# Patient Record
Sex: MALE | Race: BLACK OR AFRICAN AMERICAN | NOT HISPANIC OR LATINO | Employment: UNEMPLOYED | URBAN - METROPOLITAN AREA
[De-identification: names, ages, dates, MRNs, and addresses within clinical notes are randomized per-mention and may not be internally consistent; named-entity substitution may affect disease eponyms.]

---
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
## 2024-07-30 ENCOUNTER — OFFICE VISIT (OUTPATIENT)
Dept: PRIMARY CARE CLINIC | Facility: CLINIC | Age: 19
End: 2024-07-30
Payer: COMMERCIAL

## 2024-07-30 VITALS
HEART RATE: 76 BPM | SYSTOLIC BLOOD PRESSURE: 122 MMHG | BODY MASS INDEX: 37.75 KG/M2 | OXYGEN SATURATION: 97 % | HEIGHT: 76 IN | WEIGHT: 310 LBS | RESPIRATION RATE: 17 BRPM | DIASTOLIC BLOOD PRESSURE: 77 MMHG

## 2024-07-30 DIAGNOSIS — K58.0 IRRITABLE BOWEL SYNDROME WITH DIARRHEA: ICD-10-CM

## 2024-07-30 DIAGNOSIS — E73.9 LACTOSE INTOLERANCE: ICD-10-CM

## 2024-07-30 DIAGNOSIS — Z00.00 ENCOUNTER FOR MEDICAL EXAMINATION TO ESTABLISH CARE: Primary | ICD-10-CM

## 2024-07-30 DIAGNOSIS — F98.8 ATTENTION DEFICIT DISORDER, UNSPECIFIED HYPERACTIVITY PRESENCE: ICD-10-CM

## 2024-07-30 PROCEDURE — 99204 OFFICE O/P NEW MOD 45 MIN: CPT | Mod: ,,, | Performed by: FAMILY MEDICINE

## 2024-07-30 RX ORDER — DICYCLOMINE HYDROCHLORIDE 10 MG/1
10 CAPSULE ORAL 3 TIMES DAILY
Qty: 15 CAPSULE | Refills: 0 | Status: SHIPPED | OUTPATIENT
Start: 2024-07-30 | End: 2024-08-04

## 2024-07-30 RX ORDER — LISDEXAMFETAMINE DIMESYLATE CAPSULES 20 MG/1
20 CAPSULE ORAL EVERY MORNING
Qty: 30 CAPSULE | Refills: 0 | Status: SHIPPED | OUTPATIENT
Start: 2024-07-30 | End: 2024-08-29

## 2024-07-30 NOTE — PROGRESS NOTES
"  Family Medicine    Patient ID: [id number]     Chief Complaint: Establish Care      HPI:     Adal Swain is a 18 y.o. male here today to establish care. Hx ADD.  Going back to school and would like to get back on medication.  Was on Vyvanse in the past.  Also likely hx of IBS-D.  Colonoscopy in the past non concerning.  Typically in the fall he gets loose BM that last a few months then resolves.  No significant diet change.     Past Medical History:   Diagnosis Date    ADD (attention deficit disorder)     6th grade    Keratoconus of both eyes         Past Surgical History:   Procedure Laterality Date    FRACTURE SURGERY          Social History     Tobacco Use    Smoking status: Never    Smokeless tobacco: Never   Substance and Sexual Activity    Alcohol use: Never    Drug use: Yes     Types: Marijuana    Sexual activity: Never        Current Outpatient Medications   Medication Instructions    dicyclomine (BENTYL) 10 mg, Oral, 3 times daily    lisdexamfetamine (VYVANSE) 20 mg, Oral, Every morning       Review of patient's allergies indicates:   Allergen Reactions    Acetaminophen      Nose bleed        Patient Care Team:  Rosa Elena Lam MD as PCP - General (Family Medicine)     Subjective:     Review of Systems    12 point review of systems conducted, negative except as stated in the history of present illness. See HPI for details.    Objective:     Visit Vitals  /77 (BP Location: Left arm, Patient Position: Sitting, BP Method: Large (Automatic))   Pulse 76   Resp 17   Ht 6' 4" (1.93 m)   Wt (!) 140.6 kg (310 lb)   SpO2 97%   BMI 37.73 kg/m²       Physical Exam  Vitals and nursing note reviewed.   Constitutional:       Appearance: He is obese. He is not ill-appearing.   HENT:      Head: Normocephalic.      Mouth/Throat:      Mouth: Mucous membranes are moist.   Cardiovascular:      Rate and Rhythm: Normal rate and regular rhythm.   Pulmonary:      Effort: Pulmonary effort is normal.      Breath sounds: Normal " "breath sounds.   Neurological:      General: No focal deficit present.      Mental Status: He is alert.   Psychiatric:         Mood and Affect: Mood normal.         Labs Reviewed:     Chemistry:  Lab Results   Component Value Date     10/08/2020    K 3.9 10/08/2020    BUN 13.7 10/08/2020    CREATININE 1.06 (H) 10/08/2020    GLUCOSE 100 10/08/2020    CALCIUM 8.4 10/08/2020    ALKPHOS 142 10/08/2020    LABPROT 13.0 10/02/2023    ALBUMIN 4.0 10/08/2020    BILIDIR 0.2 10/08/2020    IBILI 0.40 10/08/2020    AST 53 (H) 10/08/2020    ALT 88 (H) 10/08/2020        No results found for: "HGBA1C", "MICROALBCREA"     Hematology:  Lab Results   Component Value Date    WBC 3.6 (L) 10/08/2020    HGB 12.7 (L) 10/08/2020    HCT 40.5 (L) 10/08/2020     10/08/2020       Lipid Panel:  No results found for: "CHOL", "HDL", "LDL", "TRIG", "TOTALCHOLEST"     Urine:  No results found for: "COLORUA", "APPEARANCEUA", "SGUA", "PHUA", "PROTEINUA", "GLUCOSEUA", "KETONESUA", "BLOODUA", "NITRITESUA", "LEUKOCYTESUR", "RBCUA", "WBCUA", "BACTERIA", "SQEPUA", "HYALINECASTS", "CREATRANDUR", "PROTEINURINE", "UPROTCREA"     Assessment:       ICD-10-CM ICD-9-CM   1. Encounter for medical examination to establish care  Z00.00 V70.9   2. Attention deficit disorder, unspecified hyperactivity presence  F98.8 314.00   3. Irritable bowel syndrome with diarrhea  K58.0 564.1   4. Lactose intolerance  E73.9 271.3        Plan:     1. Encounter for medical examination to establish care  Overview:  Annual 9/2024.  Labs ordered and will be reviewed at next visit.     Orders:  -     Ambulatory referral/consult to Psychiatry; Future; Expected date: 08/06/2024  -     dicyclomine (BENTYL) 10 MG capsule; Take 1 capsule (10 mg total) by mouth 3 (three) times daily. for 15 doses  Dispense: 15 capsule; Refill: 0  -     lisdexamfetamine (VYVANSE) 20 MG capsule; Take 1 capsule (20 mg total) by mouth every morning.  Dispense: 30 capsule; Refill: 0  -     CBC Auto " Differential; Future; Expected date: 07/30/2024  -     Comprehensive Metabolic Panel; Future; Expected date: 07/30/2024  -     Lipid Panel; Future; Expected date: 07/30/2024  -     TSH; Future; Expected date: 07/30/2024  -     Hemoglobin A1C; Future; Expected date: 07/30/2024  -     Hepatitis C Antibody; Future; Expected date: 07/30/2024  -     HIV 1/2 Ag/Ab (4th Gen); Future; Expected date: 07/30/2024    2. Attention deficit disorder, unspecified hyperactivity presence  Overview:  Rx of one month of 20mg Vyvanse sent today.  Referral for ADD/ADHD placed today.  Also needs evaluation for extended time for tests, etc.      Orders:  -     Ambulatory referral/consult to Psychiatry; Future; Expected date: 08/06/2024  -     lisdexamfetamine (VYVANSE) 20 MG capsule; Take 1 capsule (20 mg total) by mouth every morning.  Dispense: 30 capsule; Refill: 0    3. Irritable bowel syndrome with diarrhea  Overview:  Likely diagnosis.  Trial of Bentyl for cramping and diarrhea as needed.     Orders:  -     dicyclomine (BENTYL) 10 MG capsule; Take 1 capsule (10 mg total) by mouth 3 (three) times daily. for 15 doses  Dispense: 15 capsule; Refill: 0    4. Lactose intolerance         Follow up in about 2 months (around 10/7/2024) for Annual Wellness, With labwork prior to visit. In addition to their scheduled follow up, the patient has also been instructed to follow up on as needed basis.     Future Appointments   Date Time Provider Department Center   10/9/2024  3:45 PM Rosa Elena Lam MD [facility] Riley Lam MD

## 2024-08-13 ENCOUNTER — TELEPHONE (OUTPATIENT)
Dept: PRIMARY CARE CLINIC | Facility: CLINIC | Age: 19
End: 2024-08-13
Payer: COMMERCIAL

## 2024-08-13 NOTE — TELEPHONE ENCOUNTER
----- Message from Sana Phelan sent at 8/13/2024 11:21 AM CDT -----  .Who Called: Adal Swain        Preferred Method of Contact: Phone Call  Patient's Preferred Phone Number on File: [phone]   Best Call Back Number, if different:Indira [phone]  Additional Information: pt mom checking on his referral to psy